# Patient Record
Sex: MALE | Race: WHITE | Employment: UNEMPLOYED | URBAN - METROPOLITAN AREA
[De-identification: names, ages, dates, MRNs, and addresses within clinical notes are randomized per-mention and may not be internally consistent; named-entity substitution may affect disease eponyms.]

---

## 2024-07-31 ENCOUNTER — APPOINTMENT (OUTPATIENT)
Dept: GENERAL RADIOLOGY | Age: 34
End: 2024-07-31
Payer: COMMERCIAL

## 2024-07-31 ENCOUNTER — APPOINTMENT (OUTPATIENT)
Dept: CT IMAGING | Age: 34
End: 2024-07-31
Payer: COMMERCIAL

## 2024-07-31 ENCOUNTER — HOSPITAL ENCOUNTER (EMERGENCY)
Age: 34
Discharge: HOME | End: 2024-08-01
Attending: EMERGENCY MEDICINE
Payer: COMMERCIAL

## 2024-07-31 DIAGNOSIS — T71.162A SUICIDE ATTEMPT BY HANGING, INITIAL ENCOUNTER (HCC): Primary | ICD-10-CM

## 2024-07-31 LAB
ABO + RH BLD: NORMAL
ALBUMIN SERPL-MCNC: 4.4 G/DL (ref 3.5–5.2)
ALP SERPL-CCNC: 151 U/L (ref 40–129)
ALT SERPL-CCNC: 39 U/L (ref 0–40)
ANION GAP SERPL CALCULATED.3IONS-SCNC: 26 MMOL/L (ref 7–16)
APAP SERPL-MCNC: <5 UG/ML (ref 10–30)
ARM BAND NUMBER: NORMAL
AST SERPL-CCNC: 39 U/L (ref 0–39)
B.E.: -8.5 MMOL/L (ref -3–3)
BILIRUB SERPL-MCNC: 0.4 MG/DL (ref 0–1.2)
BLOOD BANK SAMPLE EXPIRATION: NORMAL
BLOOD GROUP ANTIBODIES SERPL: NEGATIVE
BUN SERPL-MCNC: 10 MG/DL (ref 6–20)
CALCIUM SERPL-MCNC: 9.3 MG/DL (ref 8.6–10.2)
CHLORIDE SERPL-SCNC: 99 MMOL/L (ref 98–107)
CO2 SERPL-SCNC: 14 MMOL/L (ref 22–29)
COHB: 0.3 % (ref 0–1.5)
CREAT SERPL-MCNC: 1.3 MG/DL (ref 0.7–1.2)
CRITICAL: ABNORMAL
DATE ANALYZED: ABNORMAL
DATE OF COLLECTION: ABNORMAL
ETHANOLAMINE SERPL-MCNC: <10 MG/DL (ref 0–0.08)
GFR, ESTIMATED: 76 ML/MIN/1.73M2
GLUCOSE SERPL-MCNC: 184 MG/DL (ref 74–99)
HCO3: 16.2 MMOL/L (ref 22–26)
HHB: 0.6 % (ref 0–5)
INR PPP: 1
LAB: ABNORMAL
LACTATE BLDV-SCNC: 10 MMOL/L (ref 0.5–2.2)
Lab: 2150
METHB: 0.7 % (ref 0–1.5)
MODE: ABNORMAL
O2 SATURATION: 99.4 % (ref 92–98.5)
O2HB: 98.4 % (ref 94–97)
OPERATOR ID: 7221
PARTIAL THROMBOPLASTIN TIME: 18.1 SEC (ref 24.5–35.1)
PATIENT TEMP: 37 C
PCO2: 32.4 MMHG (ref 35–45)
PH BLOOD GAS: 7.32 (ref 7.35–7.45)
PO2: 355.2 MMHG (ref 75–100)
POTASSIUM SERPL-SCNC: 3.88 MMOL/L (ref 3.5–5)
POTASSIUM SERPL-SCNC: 4 MMOL/L (ref 3.5–5)
PROT SERPL-MCNC: 7.8 G/DL (ref 6.4–8.3)
PROTHROMBIN TIME: 10.8 SEC (ref 9.3–12.4)
SALICYLATES SERPL-MCNC: <0.3 MG/DL (ref 0–30)
SODIUM SERPL-SCNC: 139 MMOL/L (ref 132–146)
SOURCE, BLOOD GAS: ABNORMAL
THB: 17 G/DL (ref 11.5–16.5)
TIME ANALYZED: 2151
TOXIC TRICYCLIC SC,BLOOD: NEGATIVE

## 2024-07-31 PROCEDURE — 71045 X-RAY EXAM CHEST 1 VIEW: CPT

## 2024-07-31 PROCEDURE — 86850 RBC ANTIBODY SCREEN: CPT

## 2024-07-31 PROCEDURE — 72125 CT NECK SPINE W/O DYE: CPT

## 2024-07-31 PROCEDURE — 2580000003 HC RX 258: Performed by: STUDENT IN AN ORGANIZED HEALTH CARE EDUCATION/TRAINING PROGRAM

## 2024-07-31 PROCEDURE — 99285 EMERGENCY DEPT VISIT HI MDM: CPT

## 2024-07-31 PROCEDURE — 96374 THER/PROPH/DIAG INJ IV PUSH: CPT

## 2024-07-31 PROCEDURE — 84132 ASSAY OF SERUM POTASSIUM: CPT

## 2024-07-31 PROCEDURE — 6360000002 HC RX W HCPCS

## 2024-07-31 PROCEDURE — 85730 THROMBOPLASTIN TIME PARTIAL: CPT

## 2024-07-31 PROCEDURE — 86900 BLOOD TYPING SEROLOGIC ABO: CPT

## 2024-07-31 PROCEDURE — G0480 DRUG TEST DEF 1-7 CLASSES: HCPCS

## 2024-07-31 PROCEDURE — 82805 BLOOD GASES W/O2 SATURATION: CPT

## 2024-07-31 PROCEDURE — 86901 BLOOD TYPING SEROLOGIC RH(D): CPT

## 2024-07-31 PROCEDURE — 80179 DRUG ASSAY SALICYLATE: CPT

## 2024-07-31 PROCEDURE — 83605 ASSAY OF LACTIC ACID: CPT

## 2024-07-31 PROCEDURE — 70450 CT HEAD/BRAIN W/O DYE: CPT

## 2024-07-31 PROCEDURE — 70498 CT ANGIOGRAPHY NECK: CPT

## 2024-07-31 PROCEDURE — 80143 DRUG ASSAY ACETAMINOPHEN: CPT

## 2024-07-31 PROCEDURE — 6360000004 HC RX CONTRAST MEDICATION: Performed by: RADIOLOGY

## 2024-07-31 PROCEDURE — 80053 COMPREHEN METABOLIC PANEL: CPT

## 2024-07-31 PROCEDURE — 6810039001 HC L1 TRAUMA PRIORITY

## 2024-07-31 PROCEDURE — 80307 DRUG TEST PRSMV CHEM ANLYZR: CPT

## 2024-07-31 PROCEDURE — 85610 PROTHROMBIN TIME: CPT

## 2024-07-31 RX ORDER — ACETAMINOPHEN 500 MG
1000 TABLET ORAL ONCE
Status: DISCONTINUED | OUTPATIENT
Start: 2024-07-31 | End: 2024-08-01 | Stop reason: HOSPADM

## 2024-07-31 RX ORDER — MIDAZOLAM HYDROCHLORIDE 1 MG/ML
INJECTION INTRAMUSCULAR; INTRAVENOUS
Status: DISCONTINUED
Start: 2024-07-31 | End: 2024-07-31 | Stop reason: WASHOUT

## 2024-07-31 RX ORDER — SODIUM CHLORIDE, SODIUM LACTATE, POTASSIUM CHLORIDE, AND CALCIUM CHLORIDE .6; .31; .03; .02 G/100ML; G/100ML; G/100ML; G/100ML
1000 INJECTION, SOLUTION INTRAVENOUS ONCE
Status: COMPLETED | OUTPATIENT
Start: 2024-07-31 | End: 2024-08-01

## 2024-07-31 RX ORDER — KETAMINE HYDROCHLORIDE 100 MG/ML
INJECTION, SOLUTION INTRAMUSCULAR; INTRAVENOUS
Status: DISCONTINUED
Start: 2024-07-31 | End: 2024-07-31 | Stop reason: WASHOUT

## 2024-07-31 RX ORDER — SODIUM CHLORIDE 9 MG/ML
INJECTION, SOLUTION INTRAVENOUS CONTINUOUS
Status: DISCONTINUED | OUTPATIENT
Start: 2024-07-31 | End: 2024-08-01 | Stop reason: HOSPADM

## 2024-07-31 RX ORDER — ONDANSETRON 2 MG/ML
4 INJECTION INTRAMUSCULAR; INTRAVENOUS EVERY 6 HOURS PRN
Status: DISCONTINUED | OUTPATIENT
Start: 2024-07-31 | End: 2024-08-01 | Stop reason: HOSPADM

## 2024-07-31 RX ORDER — HALOPERIDOL 5 MG/ML
5 INJECTION INTRAMUSCULAR ONCE
Status: COMPLETED | OUTPATIENT
Start: 2024-07-31 | End: 2024-07-31

## 2024-07-31 RX ORDER — MIDAZOLAM HYDROCHLORIDE 1 MG/ML
INJECTION INTRAMUSCULAR; INTRAVENOUS
Status: COMPLETED
Start: 2024-07-31 | End: 2024-07-31

## 2024-07-31 RX ADMIN — MIDAZOLAM 2 MG: 1 INJECTION INTRAMUSCULAR; INTRAVENOUS at 22:19

## 2024-07-31 RX ADMIN — HALOPERIDOL LACTATE 5 MG: 5 INJECTION, SOLUTION INTRAMUSCULAR at 22:41

## 2024-07-31 RX ADMIN — IOPAMIDOL 75 ML: 755 INJECTION, SOLUTION INTRAVENOUS at 22:09

## 2024-07-31 RX ADMIN — SODIUM CHLORIDE: 9 INJECTION, SOLUTION INTRAVENOUS at 22:55

## 2024-08-01 VITALS
WEIGHT: 175 LBS | HEIGHT: 70 IN | OXYGEN SATURATION: 96 % | DIASTOLIC BLOOD PRESSURE: 86 MMHG | BODY MASS INDEX: 25.05 KG/M2 | RESPIRATION RATE: 18 BRPM | SYSTOLIC BLOOD PRESSURE: 123 MMHG | HEART RATE: 98 BPM | TEMPERATURE: 98.9 F

## 2024-08-01 PROBLEM — T71.191A: Status: ACTIVE | Noted: 2024-08-01

## 2024-08-01 LAB
AMPHET UR QL SCN: NEGATIVE
BARBITURATES UR QL SCN: NEGATIVE
BASOPHILS # BLD: 0.04 K/UL (ref 0–0.2)
BASOPHILS NFR BLD: 0 % (ref 0–2)
BENZODIAZ UR QL: POSITIVE
BUPRENORPHINE UR QL: POSITIVE
CANNABINOIDS UR QL SCN: NEGATIVE
COCAINE UR QL SCN: NEGATIVE
EOSINOPHIL # BLD: 0 K/UL (ref 0.05–0.5)
EOSINOPHILS RELATIVE PERCENT: 0 % (ref 0–6)
ERYTHROCYTE [DISTWIDTH] IN BLOOD BY AUTOMATED COUNT: 12.6 % (ref 11.5–15)
FENTANYL UR QL: NEGATIVE
HCT VFR BLD AUTO: 43.9 % (ref 37–54)
HGB BLD-MCNC: 15 G/DL (ref 12.5–16.5)
IMM GRANULOCYTES # BLD AUTO: 0.06 K/UL (ref 0–0.58)
IMM GRANULOCYTES NFR BLD: 0 % (ref 0–5)
LACTATE BLDV-SCNC: 2.9 MMOL/L (ref 0.5–2.2)
LYMPHOCYTES NFR BLD: 1.2 K/UL (ref 1.5–4)
LYMPHOCYTES RELATIVE PERCENT: 7 % (ref 20–42)
MCH RBC QN AUTO: 30.4 PG (ref 26–35)
MCHC RBC AUTO-ENTMCNC: 34.2 G/DL (ref 32–34.5)
MCV RBC AUTO: 89 FL (ref 80–99.9)
METHADONE UR QL: NEGATIVE
MONOCYTES NFR BLD: 1.07 K/UL (ref 0.1–0.95)
MONOCYTES NFR BLD: 6 % (ref 2–12)
NEUTROPHILS NFR BLD: 87 % (ref 43–80)
NEUTS SEG NFR BLD: 15.69 K/UL (ref 1.8–7.3)
OPIATES UR QL SCN: NEGATIVE
OXYCODONE UR QL SCN: NEGATIVE
PCP UR QL SCN: NEGATIVE
PLATELET # BLD AUTO: 170 K/UL (ref 130–450)
PMV BLD AUTO: 11.9 FL (ref 7–12)
RBC # BLD AUTO: 4.93 M/UL (ref 3.8–5.8)
TEST INFORMATION: ABNORMAL
WBC OTHER # BLD: 18.1 K/UL (ref 4.5–11.5)

## 2024-08-01 PROCEDURE — 96375 TX/PRO/DX INJ NEW DRUG ADDON: CPT

## 2024-08-01 PROCEDURE — 36415 COLL VENOUS BLD VENIPUNCTURE: CPT

## 2024-08-01 PROCEDURE — 85025 COMPLETE CBC W/AUTO DIFF WBC: CPT

## 2024-08-01 PROCEDURE — 80307 DRUG TEST PRSMV CHEM ANLYZR: CPT

## 2024-08-01 PROCEDURE — 6360000002 HC RX W HCPCS

## 2024-08-01 PROCEDURE — 83605 ASSAY OF LACTIC ACID: CPT

## 2024-08-01 PROCEDURE — 2580000003 HC RX 258

## 2024-08-01 RX ORDER — LORAZEPAM 2 MG/ML
2 INJECTION INTRAMUSCULAR ONCE
Status: COMPLETED | OUTPATIENT
Start: 2024-08-01 | End: 2024-08-01

## 2024-08-01 RX ADMIN — SODIUM CHLORIDE, POTASSIUM CHLORIDE, SODIUM LACTATE AND CALCIUM CHLORIDE 1000 ML: 600; 310; 30; 20 INJECTION, SOLUTION INTRAVENOUS at 01:13

## 2024-08-01 RX ADMIN — LORAZEPAM 2 MG: 2 INJECTION INTRAMUSCULAR; INTRAVENOUS at 01:14

## 2024-08-01 NOTE — DISCHARGE INSTRUCTIONS
TRAUMA SERVICES DISCHARGE INSTRUCTIONS    Call [phone], option 2, for any questions/concerns and for follow-up appointment in 1 week(s).    Please follow the instructions checked below:Please follow-up with your primary care provider.    ACTIVITY INSTRUCTIONS  Increase activity as tolerated  No heavy lifting or strenuous activity  Take your incentive spirometer home and use 4-6 times/day    MEDICATION INSTRUCTIONS  Take medication as prescribed.  When taking pain medications, you may experience dizziness or drowsiness.  Do not drink alcohol or drive when taking these medications.  You may experience constipation while taking pain medication.  You may take over the counter stool softeners such as docusate (Colace), sennosides S (Senokot-S), or Miralax.   []  You may take Ibuprofen (over the counter) as directed for mild pain.     --You may take up to 800mg every 8 hours for pain, please take with food or milk.   []  You may take acetaminophen (Tylenol) products.  Do NOT take more than 4000mg of Tylenol in 24h.   []  Do not take any other acetaminophen (Tylenol) products if you are taking Percocet or Norco, as these contain Tylenol.   --Do NOT take more than 4000mg of Tylenol in 24h.    OPIOID MEDICATION INSTRUCTIONS  Read the medication guide that is included with your prescription.  Take your medication exactly as prescribed.  Store medication away from children and in a safe place.  Do NOT share your medication with others.  Do NOT take medication unless it is prescribed for you.  Do NOT drink alcohol while taking opioids (I.e., Norco, Percocet, Oxycodone, etc).  Discuss with the Trauma Clinic staff if the dose of medication you are taking does not control your pain and any side effects that you may be having.      CALL 911 OR YOUR LOCAL EMERGENCY SERVICE:  --If you take too much medication  --If you have trouble breathing or shortness of breath  --A child has taken this medication.    WORK:  You may not
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
